# Patient Record
Sex: MALE | Race: WHITE | NOT HISPANIC OR LATINO | Employment: UNEMPLOYED | URBAN - METROPOLITAN AREA
[De-identification: names, ages, dates, MRNs, and addresses within clinical notes are randomized per-mention and may not be internally consistent; named-entity substitution may affect disease eponyms.]

---

## 2018-04-07 ENCOUNTER — HOSPITAL ENCOUNTER (EMERGENCY)
Facility: HOSPITAL | Age: 78
End: 2018-04-07
Attending: EMERGENCY MEDICINE | Admitting: EMERGENCY MEDICINE
Payer: COMMERCIAL

## 2018-04-07 VITALS — SYSTOLIC BLOOD PRESSURE: 113 MMHG | DIASTOLIC BLOOD PRESSURE: 55 MMHG

## 2018-04-07 DIAGNOSIS — I46.9 CARDIOPULMONARY ARREST (HCC): Primary | ICD-10-CM

## 2018-04-07 LAB — GLUCOSE SERPL-MCNC: 68 MG/DL (ref 65–140)

## 2018-04-07 PROCEDURE — 92950 HEART/LUNG RESUSCITATION CPR: CPT

## 2018-04-07 PROCEDURE — 96374 THER/PROPH/DIAG INJ IV PUSH: CPT

## 2018-04-07 PROCEDURE — 94762 N-INVAS EAR/PLS OXIMTRY CONT: CPT

## 2018-04-07 PROCEDURE — 96375 TX/PRO/DX INJ NEW DRUG ADDON: CPT

## 2018-04-07 PROCEDURE — 82948 REAGENT STRIP/BLOOD GLUCOSE: CPT

## 2018-04-07 PROCEDURE — 99285 EMERGENCY DEPT VISIT HI MDM: CPT

## 2018-04-07 RX ORDER — EPINEPHRINE 0.1 MG/ML
SYRINGE (ML) INJECTION CODE/TRAUMA/SEDATION MEDICATION
Status: COMPLETED | OUTPATIENT
Start: 2018-04-07 | End: 2018-04-07

## 2018-04-07 RX ADMIN — EPINEPHRINE 1 MG: 0.1 INJECTION, SOLUTION ENDOTRACHEAL; INTRACARDIAC; INTRAVENOUS at 15:44

## 2018-04-07 RX ADMIN — SODIUM CHLORIDE 1000 ML: 0.9 INJECTION, SOLUTION INTRAVENOUS at 15:43

## 2018-04-07 NOTE — ED NOTES
Report from EMS  Wife called 48 when she found  unrepsonsive  Patient was last seen well 20 minutes prior  EMS initiated CPR  BS 41  Intubated with 8 0 ET tube, 25cm at the lip  IV access - 18g left forearm  8 rounds of epi given  300mg and 150mg of amiodarone given  1 amp of D50 given  1 amp of sodium bicarbonate given    As per EMS, patient regained pulses twice during resuscitation attempt  Patient had pulses upon arrival and lost them while unloading from the ambulance  Patient arrived to room with CPR in progress  Code proceeded as documented       Malachi Pat RN  04/07/18 1546

## 2018-04-07 NOTE — ED NOTES
Call placed to [facility]  Spoke with Lexie Miranda  Patient determined to be tissue candidate at [phone]  Contact information for family provided       Rachael Salazar RN  04/07/18 1683

## 2018-04-08 NOTE — ED PROVIDER NOTES
History  Chief Complaint   Patient presents with    Cardiac Arrest       History provided by:  EMS personnel and significant other (son)  History limited by:  Acuity of condition   used: No    Cardiac Arrest   Witnessed by:  Not witnessed  Incident location:  Home  Time since incident:  50 minutes  Time before BLS initiated:  > 5 minutes  Time before ALS initiated:  > 10 minutes  Condition upon EMS arrival:  Apneic  Pulse:  Absent  Initial cardiac rhythm per EMS:  Asystole (and VF per EMS, shocked x1, with ROSC x2, but lost pulses PTA)  Treatments prior to arrival:  ACLS protocol  Medications given prior to ED:  Epinephrine and sodium bicarbonate (D50 x1 amp)  Airway:  Intubation prior to arrival  Rhythm on admission to ED:  PEA  Associated symptoms comment:  Per wife, pt had been having leg swelling, and wt gain s/t "water" given water pill and lost 60 lbs, seen about 1 month ago, but PMH not known b/c prior to that pt never went to doctor  During visit 1 month ago, pt was advised to come to the hospital, but did not come  Risk factors: heart problem    Risk factors comment:  Probable CHF, otherwise unknown      None       No past medical history on file  No past surgical history on file  No family history on file  I have reviewed and agree with the history as documented      Social History   Substance Use Topics    Smoking status: Not on file    Smokeless tobacco: Not on file    Alcohol use Not on file        Review of Systems   Unable to perform ROS: Acuity of condition       Physical Exam  ED Triage Vitals [04/07/18 1545]   Temp Pulse Resp Blood Pressure SpO2   -- -- -- (!) 171/62 --      Temp src Heart Rate Source Patient Position - Orthostatic VS BP Location FiO2 (%)   -- -- Lying Right arm --      Pain Score       --           Orthostatic Vital Signs  Vitals:    04/07/18 1545 04/07/18 1546   BP: (!) 171/62 113/55   Patient Position - Orthostatic VS: Lying Lying       Physical Exam   Constitutional:   Ill appearing elderly male   HENT:   Head: Normocephalic and atraumatic  Eyes:   Pupils fixed and dilated   Cardiovascular:   Pulseless  PEA   Pulmonary/Chest:   apneic   Abdominal: Soft  He exhibits no distension  Musculoskeletal: He exhibits no edema  Neurological:   unconscious   Skin: Skin is warm  Vitals reviewed  ED Medications  Medications   EPINEPHrine (ADRENALIN) 1 mg/10 mL injection (1 mg Intravenous Given 18 1544)   sodium chloride 0 9 % bolus (1,000 mL Intravenous Given 18 1543)       Diagnostic Studies  Results Reviewed     Procedure Component Value Units Date/Time    Fingerstick Glucose (POCT) [[id number]]  (Normal) Collected:  18 154    Lab Status:  Final result Updated:  18     POC Glucose 68 mg/dl                  No orders to display              Procedures  Procedures       Phone Contacts  ED Phone Contact    ED Course  ED Course                                MDM  Number of Diagnoses or Management Options  Cardiopulmonary arrest [facility]):   Diagnosis management comments: DOA  ACLS protocol > 1 hr w/o ROSC  Bedside echo - no cardiac activity noted  Pt pronounced dead in ED  Wife and son notified in person    Risk of Complications, Morbidity, and/or Mortality  Presenting problems: high  Diagnostic procedures: minimal  Management options: minimal    Patient Progress  Patient progress: other (comment) ()    CritCare Time    Disposition  Final diagnoses:   Cardiopulmonary arrest ([address] )     Time reflects when diagnosis was documented in both MDM as applicable and the Disposition within this note     Time User Action Codes Description Comment    2018  4:08 PM C/ Paty Beck 89, [address] [[id number]] Cardiopulmonary arrest [facility])       ED Disposition     ED Disposition Condition Comment    Discharge  [address] [place] [facility]      Condition at [place]:         Follow-up Information    None       There are no discharge medications for this patient  No discharge procedures on file      ED Provider  Electronically Signed by           Kain Mancilla MD  04/08/18 7273